# Patient Record
Sex: MALE | Race: OTHER | HISPANIC OR LATINO | URBAN - METROPOLITAN AREA
[De-identification: names, ages, dates, MRNs, and addresses within clinical notes are randomized per-mention and may not be internally consistent; named-entity substitution may affect disease eponyms.]

---

## 2017-06-20 ENCOUNTER — EMERGENCY (EMERGENCY)
Facility: HOSPITAL | Age: 32
LOS: 1 days | Discharge: LEFT BEFORE DISCHARGE | End: 2017-06-20
Admitting: EMERGENCY MEDICINE
Payer: SELF-PAY

## 2017-06-20 VITALS
DIASTOLIC BLOOD PRESSURE: 72 MMHG | OXYGEN SATURATION: 18 % | HEART RATE: 93 BPM | RESPIRATION RATE: 18 BRPM | TEMPERATURE: 98 F | WEIGHT: 149.91 LBS | SYSTOLIC BLOOD PRESSURE: 121 MMHG

## 2017-06-20 PROCEDURE — T1013: CPT

## 2017-06-20 NOTE — ED ADULT TRIAGE NOTE - CHIEF COMPLAINT QUOTE
patient states that he has had abdominal pain x1 year. he states that he has had nausea and vomitting x1 months. He states that when he gets up in the morning he feels like he has to vomit

## 2017-06-21 VITALS
TEMPERATURE: 98 F | SYSTOLIC BLOOD PRESSURE: 120 MMHG | DIASTOLIC BLOOD PRESSURE: 67 MMHG | RESPIRATION RATE: 18 BRPM | HEART RATE: 75 BPM | OXYGEN SATURATION: 98 %

## 2017-09-22 ENCOUNTER — EMERGENCY (EMERGENCY)
Facility: HOSPITAL | Age: 32
LOS: 1 days | Discharge: HOME | End: 2017-09-22
Attending: EMERGENCY MEDICINE | Admitting: EMERGENCY MEDICINE
Payer: SELF-PAY

## 2017-09-22 VITALS
RESPIRATION RATE: 18 BRPM | SYSTOLIC BLOOD PRESSURE: 147 MMHG | OXYGEN SATURATION: 99 % | HEART RATE: 87 BPM | TEMPERATURE: 98 F | WEIGHT: 154.98 LBS | DIASTOLIC BLOOD PRESSURE: 79 MMHG

## 2017-09-22 PROCEDURE — 99284 EMERGENCY DEPT VISIT MOD MDM: CPT | Mod: 25

## 2017-09-22 PROCEDURE — 99053 MED SERV 10PM-8AM 24 HR FAC: CPT

## 2017-09-23 VITALS
HEART RATE: 78 BPM | SYSTOLIC BLOOD PRESSURE: 128 MMHG | OXYGEN SATURATION: 99 % | TEMPERATURE: 98 F | DIASTOLIC BLOOD PRESSURE: 80 MMHG | RESPIRATION RATE: 20 BRPM

## 2017-09-23 LAB
ALBUMIN SERPL ELPH-MCNC: 4.7 G/DL — SIGNIFICANT CHANGE UP (ref 3.3–5.2)
ALP SERPL-CCNC: 80 U/L — SIGNIFICANT CHANGE UP (ref 40–120)
ALT FLD-CCNC: 35 U/L — SIGNIFICANT CHANGE UP
ANION GAP SERPL CALC-SCNC: 15 MMOL/L — SIGNIFICANT CHANGE UP (ref 5–17)
APPEARANCE UR: CLEAR — SIGNIFICANT CHANGE UP
AST SERPL-CCNC: 31 U/L — SIGNIFICANT CHANGE UP
BASOPHILS # BLD AUTO: 0 K/UL — SIGNIFICANT CHANGE UP (ref 0–0.2)
BASOPHILS NFR BLD AUTO: 0.3 % — SIGNIFICANT CHANGE UP (ref 0–2)
BILIRUB SERPL-MCNC: 0.3 MG/DL — LOW (ref 0.4–2)
BILIRUB UR-MCNC: NEGATIVE — SIGNIFICANT CHANGE UP
BUN SERPL-MCNC: 9 MG/DL — SIGNIFICANT CHANGE UP (ref 8–20)
CALCIUM SERPL-MCNC: 9.1 MG/DL — SIGNIFICANT CHANGE UP (ref 8.6–10.2)
CHLORIDE SERPL-SCNC: 99 MMOL/L — SIGNIFICANT CHANGE UP (ref 98–107)
CO2 SERPL-SCNC: 23 MMOL/L — SIGNIFICANT CHANGE UP (ref 22–29)
COLOR SPEC: YELLOW — SIGNIFICANT CHANGE UP
CREAT SERPL-MCNC: 0.48 MG/DL — LOW (ref 0.5–1.3)
DIFF PNL FLD: NEGATIVE — SIGNIFICANT CHANGE UP
EOSINOPHIL # BLD AUTO: 0.4 K/UL — SIGNIFICANT CHANGE UP (ref 0–0.5)
EOSINOPHIL NFR BLD AUTO: 3.9 % — SIGNIFICANT CHANGE UP (ref 0–5)
ETHANOL SERPL-MCNC: 61 MG/DL — SIGNIFICANT CHANGE UP
GLUCOSE SERPL-MCNC: 95 MG/DL — SIGNIFICANT CHANGE UP (ref 70–115)
GLUCOSE UR QL: NEGATIVE MG/DL — SIGNIFICANT CHANGE UP
HCT VFR BLD CALC: 46.9 % — SIGNIFICANT CHANGE UP (ref 42–52)
HGB BLD-MCNC: 16 G/DL — SIGNIFICANT CHANGE UP (ref 14–18)
KETONES UR-MCNC: NEGATIVE — SIGNIFICANT CHANGE UP
LEUKOCYTE ESTERASE UR-ACNC: NEGATIVE — SIGNIFICANT CHANGE UP
LIDOCAIN IGE QN: 25 U/L — SIGNIFICANT CHANGE UP (ref 22–51)
LYMPHOCYTES # BLD AUTO: 2.4 K/UL — SIGNIFICANT CHANGE UP (ref 1–4.8)
LYMPHOCYTES # BLD AUTO: 23.9 % — SIGNIFICANT CHANGE UP (ref 20–55)
MCHC RBC-ENTMCNC: 32.7 PG — HIGH (ref 27–31)
MCHC RBC-ENTMCNC: 34.1 G/DL — SIGNIFICANT CHANGE UP (ref 32–36)
MCV RBC AUTO: 95.9 FL — HIGH (ref 80–94)
MONOCYTES # BLD AUTO: 0.6 K/UL — SIGNIFICANT CHANGE UP (ref 0–0.8)
MONOCYTES NFR BLD AUTO: 6 % — SIGNIFICANT CHANGE UP (ref 3–10)
NEUTROPHILS # BLD AUTO: 6.6 K/UL — SIGNIFICANT CHANGE UP (ref 1.8–8)
NEUTROPHILS NFR BLD AUTO: 65.5 % — SIGNIFICANT CHANGE UP (ref 37–73)
NITRITE UR-MCNC: NEGATIVE — SIGNIFICANT CHANGE UP
PH UR: 7 — SIGNIFICANT CHANGE UP (ref 5–8)
PLATELET # BLD AUTO: 335 K/UL — SIGNIFICANT CHANGE UP (ref 150–400)
POTASSIUM SERPL-MCNC: 3.6 MMOL/L — SIGNIFICANT CHANGE UP (ref 3.5–5.3)
POTASSIUM SERPL-SCNC: 3.6 MMOL/L — SIGNIFICANT CHANGE UP (ref 3.5–5.3)
PROT SERPL-MCNC: 7.6 G/DL — SIGNIFICANT CHANGE UP (ref 6.6–8.7)
PROT UR-MCNC: NEGATIVE MG/DL — SIGNIFICANT CHANGE UP
RBC # BLD: 4.89 M/UL — SIGNIFICANT CHANGE UP (ref 4.6–6.2)
RBC # FLD: 13 % — SIGNIFICANT CHANGE UP (ref 11–15.6)
SODIUM SERPL-SCNC: 137 MMOL/L — SIGNIFICANT CHANGE UP (ref 135–145)
SP GR SPEC: 1 — LOW (ref 1.01–1.02)
UROBILINOGEN FLD QL: NEGATIVE MG/DL — SIGNIFICANT CHANGE UP
WBC # BLD: 10 K/UL — SIGNIFICANT CHANGE UP (ref 4.8–10.8)
WBC # FLD AUTO: 10 K/UL — SIGNIFICANT CHANGE UP (ref 4.8–10.8)

## 2017-09-23 PROCEDURE — 81003 URINALYSIS AUTO W/O SCOPE: CPT

## 2017-09-23 PROCEDURE — 83690 ASSAY OF LIPASE: CPT

## 2017-09-23 PROCEDURE — 99284 EMERGENCY DEPT VISIT MOD MDM: CPT | Mod: 25

## 2017-09-23 PROCEDURE — 96374 THER/PROPH/DIAG INJ IV PUSH: CPT

## 2017-09-23 PROCEDURE — 36415 COLL VENOUS BLD VENIPUNCTURE: CPT

## 2017-09-23 PROCEDURE — 80053 COMPREHEN METABOLIC PANEL: CPT

## 2017-09-23 PROCEDURE — 85027 COMPLETE CBC AUTOMATED: CPT

## 2017-09-23 PROCEDURE — 80307 DRUG TEST PRSMV CHEM ANLYZR: CPT

## 2017-09-23 RX ORDER — SODIUM CHLORIDE 9 MG/ML
3 INJECTION INTRAMUSCULAR; INTRAVENOUS; SUBCUTANEOUS ONCE
Qty: 0 | Refills: 0 | Status: COMPLETED | OUTPATIENT
Start: 2017-09-23 | End: 2017-09-23

## 2017-09-23 RX ORDER — FAMOTIDINE 10 MG/ML
20 INJECTION INTRAVENOUS ONCE
Qty: 0 | Refills: 0 | Status: COMPLETED | OUTPATIENT
Start: 2017-09-23 | End: 2017-09-23

## 2017-09-23 RX ORDER — SODIUM CHLORIDE 9 MG/ML
1000 INJECTION INTRAMUSCULAR; INTRAVENOUS; SUBCUTANEOUS ONCE
Qty: 0 | Refills: 0 | Status: COMPLETED | OUTPATIENT
Start: 2017-09-23 | End: 2017-09-23

## 2017-09-23 RX ADMIN — SODIUM CHLORIDE 3 MILLILITER(S): 9 INJECTION INTRAMUSCULAR; INTRAVENOUS; SUBCUTANEOUS at 02:46

## 2017-09-23 RX ADMIN — SODIUM CHLORIDE 1000 MILLILITER(S): 9 INJECTION INTRAMUSCULAR; INTRAVENOUS; SUBCUTANEOUS at 02:46

## 2017-09-23 RX ADMIN — Medication 30 MILLILITER(S): at 02:45

## 2017-09-23 RX ADMIN — FAMOTIDINE 20 MILLIGRAM(S): 10 INJECTION INTRAVENOUS at 02:45

## 2017-09-23 NOTE — ED PROVIDER NOTE - OBJECTIVE STATEMENT
31 yo male smoker, with a h/o alcohol use (drank today) c/o abdominal pain x 1 year.  worse when supine and when waking up in AM  + nausea  never had a work up

## 2017-09-23 NOTE — ED ADULT NURSE NOTE - OBJECTIVE STATEMENT
Pt received sitting on stretcher in NAD. Pt AOx3 C/o Epi gastric pain x2weeks.  Neuro WNL. PERRLA. Lungs CTA, RR even unlabored. Ab soft non distended. nontender, + bowel sounds x 4quads. Denies Nausea, Vomiting, Diarrhea. Skin warm, dry, color appropriate for age and race.

## 2017-09-23 NOTE — ED PROVIDER NOTE - CONSTITUTIONAL, MLM
normal... Well appearing, well nourished, awake, alert, oriented to person, place, time/situation and in no apparent distress. + AOB

## 2017-09-23 NOTE — ED PROVIDER NOTE - MEDICAL DECISION MAKING DETAILS
abd pain x 1 yr  - h&p suggests gerd/gastritis  - check labs and re-evaluate after gi meds  - no indication for imaging at this time

## 2020-03-06 NOTE — ED PROVIDER NOTE - PROGRESS NOTE DETAILS
Date of last refill of this med was 2/27, # of pills given 270 and # of refills given 1. Their next appointment is 3/30, the last date patient was seen was 9/30. Does patient have medication agreement on file? no  Has drug screen been done in last 12 months if needed?  no endorsed to evening team .   follow up CMP  if nml d/c with instructions on GERD recd sign out  cmp checked, discussed with patient in Uzbek follow up addressed

## 2021-03-09 ENCOUNTER — EMERGENCY (EMERGENCY)
Facility: HOSPITAL | Age: 36
LOS: 1 days | Discharge: HOME | End: 2021-03-09
Attending: EMERGENCY MEDICINE
Payer: MEDICAID

## 2021-03-09 VITALS
DIASTOLIC BLOOD PRESSURE: 79 MMHG | TEMPERATURE: 98 F | HEIGHT: 67 IN | OXYGEN SATURATION: 98 % | SYSTOLIC BLOOD PRESSURE: 127 MMHG | RESPIRATION RATE: 16 BRPM | HEART RATE: 91 BPM

## 2021-03-09 VITALS
TEMPERATURE: 99 F | HEART RATE: 77 BPM | SYSTOLIC BLOOD PRESSURE: 120 MMHG | RESPIRATION RATE: 18 BRPM | OXYGEN SATURATION: 98 % | DIASTOLIC BLOOD PRESSURE: 78 MMHG

## 2021-03-09 LAB
ALBUMIN SERPL ELPH-MCNC: 4.8 G/DL — SIGNIFICANT CHANGE UP (ref 3.3–5.2)
ALP SERPL-CCNC: 87 U/L — SIGNIFICANT CHANGE UP (ref 40–120)
ALT FLD-CCNC: 47 U/L — HIGH
ANION GAP SERPL CALC-SCNC: 13 MMOL/L — SIGNIFICANT CHANGE UP (ref 5–17)
APPEARANCE UR: CLEAR — SIGNIFICANT CHANGE UP
AST SERPL-CCNC: 31 U/L — SIGNIFICANT CHANGE UP
BILIRUB SERPL-MCNC: 0.2 MG/DL — LOW (ref 0.4–2)
BILIRUB UR-MCNC: NEGATIVE — SIGNIFICANT CHANGE UP
BUN SERPL-MCNC: 14 MG/DL — SIGNIFICANT CHANGE UP (ref 8–20)
CALCIUM SERPL-MCNC: 8.9 MG/DL — SIGNIFICANT CHANGE UP (ref 8.6–10.2)
CHLORIDE SERPL-SCNC: 103 MMOL/L — SIGNIFICANT CHANGE UP (ref 98–107)
CO2 SERPL-SCNC: 24 MMOL/L — SIGNIFICANT CHANGE UP (ref 22–29)
COLOR SPEC: YELLOW — SIGNIFICANT CHANGE UP
CREAT SERPL-MCNC: 0.87 MG/DL — SIGNIFICANT CHANGE UP (ref 0.5–1.3)
DIFF PNL FLD: NEGATIVE — SIGNIFICANT CHANGE UP
GLUCOSE SERPL-MCNC: 96 MG/DL — SIGNIFICANT CHANGE UP (ref 70–99)
GLUCOSE UR QL: NEGATIVE MG/DL — SIGNIFICANT CHANGE UP
HCT VFR BLD CALC: 44.7 % — SIGNIFICANT CHANGE UP (ref 39–50)
HGB BLD-MCNC: 14.7 G/DL — SIGNIFICANT CHANGE UP (ref 13–17)
KETONES UR-MCNC: NEGATIVE — SIGNIFICANT CHANGE UP
LEUKOCYTE ESTERASE UR-ACNC: NEGATIVE — SIGNIFICANT CHANGE UP
MCHC RBC-ENTMCNC: 31.3 PG — SIGNIFICANT CHANGE UP (ref 27–34)
MCHC RBC-ENTMCNC: 32.9 GM/DL — SIGNIFICANT CHANGE UP (ref 32–36)
MCV RBC AUTO: 95.3 FL — SIGNIFICANT CHANGE UP (ref 80–100)
NITRITE UR-MCNC: NEGATIVE — SIGNIFICANT CHANGE UP
PH UR: 7 — SIGNIFICANT CHANGE UP (ref 5–8)
PLATELET # BLD AUTO: 303 K/UL — SIGNIFICANT CHANGE UP (ref 150–400)
POTASSIUM SERPL-MCNC: 3.7 MMOL/L — SIGNIFICANT CHANGE UP (ref 3.5–5.3)
POTASSIUM SERPL-SCNC: 3.7 MMOL/L — SIGNIFICANT CHANGE UP (ref 3.5–5.3)
PROT SERPL-MCNC: 7.1 G/DL — SIGNIFICANT CHANGE UP (ref 6.6–8.7)
PROT UR-MCNC: NEGATIVE MG/DL — SIGNIFICANT CHANGE UP
RBC # BLD: 4.69 M/UL — SIGNIFICANT CHANGE UP (ref 4.2–5.8)
RBC # FLD: 11.9 % — SIGNIFICANT CHANGE UP (ref 10.3–14.5)
SODIUM SERPL-SCNC: 140 MMOL/L — SIGNIFICANT CHANGE UP (ref 135–145)
SP GR SPEC: 1.01 — SIGNIFICANT CHANGE UP (ref 1.01–1.02)
UROBILINOGEN FLD QL: NEGATIVE MG/DL — SIGNIFICANT CHANGE UP
WBC # BLD: 9.72 K/UL — SIGNIFICANT CHANGE UP (ref 3.8–10.5)
WBC # FLD AUTO: 9.72 K/UL — SIGNIFICANT CHANGE UP (ref 3.8–10.5)

## 2021-03-09 PROCEDURE — 74176 CT ABD & PELVIS W/O CONTRAST: CPT | Mod: 26,MA

## 2021-03-09 PROCEDURE — 99285 EMERGENCY DEPT VISIT HI MDM: CPT

## 2021-03-09 PROCEDURE — 81003 URINALYSIS AUTO W/O SCOPE: CPT

## 2021-03-09 PROCEDURE — 99284 EMERGENCY DEPT VISIT MOD MDM: CPT | Mod: 25

## 2021-03-09 PROCEDURE — 74176 CT ABD & PELVIS W/O CONTRAST: CPT

## 2021-03-09 PROCEDURE — 36415 COLL VENOUS BLD VENIPUNCTURE: CPT

## 2021-03-09 PROCEDURE — 96374 THER/PROPH/DIAG INJ IV PUSH: CPT

## 2021-03-09 PROCEDURE — 80053 COMPREHEN METABOLIC PANEL: CPT

## 2021-03-09 PROCEDURE — 85027 COMPLETE CBC AUTOMATED: CPT

## 2021-03-09 RX ORDER — SODIUM CHLORIDE 9 MG/ML
1000 INJECTION INTRAMUSCULAR; INTRAVENOUS; SUBCUTANEOUS ONCE
Refills: 0 | Status: COMPLETED | OUTPATIENT
Start: 2021-03-09 | End: 2021-03-09

## 2021-03-09 RX ORDER — KETOROLAC TROMETHAMINE 30 MG/ML
30 SYRINGE (ML) INJECTION ONCE
Refills: 0 | Status: DISCONTINUED | OUTPATIENT
Start: 2021-03-09 | End: 2021-03-09

## 2021-03-09 RX ADMIN — Medication 30 MILLIGRAM(S): at 14:04

## 2021-03-09 RX ADMIN — SODIUM CHLORIDE 1000 MILLILITER(S): 9 INJECTION INTRAMUSCULAR; INTRAVENOUS; SUBCUTANEOUS at 14:04

## 2021-03-09 NOTE — ED STATDOCS - OBJECTIVE STATEMENT
36 y/o male with no PMHx presents to ED c/o urinary symptoms. Patient reports suprapubic and back pain for the past week, gradually worsening, also reporting painful urination and right testicle pain.     PMD: Dr. Coleman

## 2021-03-09 NOTE — ED STATDOCS - CLINICAL SUMMARY MEDICAL DECISION MAKING FREE TEXT BOX
abdominal pain for 1 week with urinary symptoms. Evaluate for UTI vs renal stone. Check labs, UA, CT renal

## 2021-03-09 NOTE — ED ADULT NURSE NOTE - OBJECTIVE STATEMENT
Patient c/o flank pain and urinary symptoms. Patient denies discharge, denies fever. alert and oriented x4, skin is warm and dry, respirations are unlabored.

## 2021-03-09 NOTE — ED ADULT NURSE NOTE - NSIMPLEMENTINTERV_GEN_ALL_ED
Implemented All Universal Safety Interventions:  [place] to call system. Call bell, personal items and telephone within reach. Instruct patient to call for assistance. Room bathroom lighting operational. Non-slip footwear when patient is off stretcher. Physically safe environment: no spills, clutter or unnecessary equipment. Stretcher in lowest position, wheels locked, appropriate side rails in place.

## 2021-03-09 NOTE — ED STATDOCS - NS_ ATTENDINGSCRIBEDETAILS _ED_A_ED_FT
I, Rafa Brown, performed the initial face to face bedside interview with this patient regarding history of present illness, review of symptoms and relevant past medical, social and family history.  I completed an independent physical examination.  I was the initial provider who evaluated this patient. I have signed out the follow up of any pending tests (i.e. labs, radiological studies) to the ACP.  I have communicated the patient’s plan of care and disposition with the ACP.  The history, relevant review of systems, past medical and surgical history, medical decision making, and physical examination was documented by the scribe in my presence and I attest to the accuracy of the documentation.

## 2021-03-09 NOTE — ED STATDOCS - CARE PROVIDER_API CALL
Db Mcknight (MD)  Gastroenterology; Internal Medicine  [address], [address]  [place] [address]  Phone: [phone]  Fax: [phone]  Follow Up Time: 7-10 Days

## 2021-03-09 NOTE — ED STATDOCS - PATIENT PORTAL LINK FT
You can access the FollowMyHealth Patient Portal offered by [facility] by registering at the following website: [email or web address]. By joining [employer]’s FollowMyHealth portal, you will also be able to view your health information using other applications (apps) compatible with our system.

## 2021-03-09 NOTE — ED STATDOCS - CPE ED MUSC NORM
Christine Morgan   MRN: [id number]    Department:  [facility] Emergency Department   Date of Visit:  5/23/2019           Disclosure     Insurance plans vary and the physician(s) referred by the ER may not be covered by your plan.  Please contact CARE PHYSICIAN AT ONCE OR RETURN IMMEDIATELY TO THE EMERGENCY DEPARTMENT. If you have been prescribed any medication(s), please fill your prescription right away and begin taking the medication(s) as directed.   If you believe that any of the medications normal...

## 2021-03-09 NOTE — ED STATDOCS - PROGRESS NOTE DETAILS
ALTAF OSBORN: PT evaluated by intake physician. HPI/PE/ROS as noted above. Will follow up plan per intake physician   discussesd with patient results of blood work and CT. advised on fu with pmd and given gi referral   given strict return precautions verbalizes understanding

## 2021-03-09 NOTE — ED ADULT TRIAGE NOTE - CHIEF COMPLAINT QUOTE
Pt with 1 weeks of suprapubic pain and R flank pain with urinary difficulties/pain. Started on Cipro and not helping.

## 2021-06-29 ENCOUNTER — EMERGENCY (EMERGENCY)
Facility: HOSPITAL | Age: 36
LOS: 1 days | Discharge: HOME | End: 2021-06-29
Attending: EMERGENCY MEDICINE
Payer: MEDICAID

## 2021-06-29 VITALS
SYSTOLIC BLOOD PRESSURE: 143 MMHG | OXYGEN SATURATION: 97 % | HEIGHT: 67 IN | WEIGHT: 169.98 LBS | DIASTOLIC BLOOD PRESSURE: 79 MMHG | HEART RATE: 99 BPM | TEMPERATURE: 98 F | RESPIRATION RATE: 16 BRPM

## 2021-06-29 LAB
ALBUMIN SERPL ELPH-MCNC: 4.2 G/DL — SIGNIFICANT CHANGE UP (ref 3.3–5.2)
ALP SERPL-CCNC: 91 U/L — SIGNIFICANT CHANGE UP (ref 40–120)
ALT FLD-CCNC: 103 U/L — HIGH
ANION GAP SERPL CALC-SCNC: 15 MMOL/L — SIGNIFICANT CHANGE UP (ref 5–17)
AST SERPL-CCNC: 47 U/L — HIGH
BASOPHILS # BLD AUTO: 0.05 K/UL — SIGNIFICANT CHANGE UP (ref 0–0.2)
BASOPHILS NFR BLD AUTO: 0.7 % — SIGNIFICANT CHANGE UP (ref 0–2)
BILIRUB SERPL-MCNC: <0.2 MG/DL — LOW (ref 0.4–2)
BUN SERPL-MCNC: 11 MG/DL — SIGNIFICANT CHANGE UP (ref 8–20)
CALCIUM SERPL-MCNC: 8.6 MG/DL — SIGNIFICANT CHANGE UP (ref 8.6–10.2)
CHLORIDE SERPL-SCNC: 104 MMOL/L — SIGNIFICANT CHANGE UP (ref 98–107)
CO2 SERPL-SCNC: 21 MMOL/L — LOW (ref 22–29)
CREAT SERPL-MCNC: 0.53 MG/DL — SIGNIFICANT CHANGE UP (ref 0.5–1.3)
EOSINOPHIL # BLD AUTO: 0.31 K/UL — SIGNIFICANT CHANGE UP (ref 0–0.5)
EOSINOPHIL NFR BLD AUTO: 4.6 % — SIGNIFICANT CHANGE UP (ref 0–6)
GLUCOSE SERPL-MCNC: 111 MG/DL — HIGH (ref 70–99)
HCT VFR BLD CALC: 43.7 % — SIGNIFICANT CHANGE UP (ref 39–50)
HGB BLD-MCNC: 14.3 G/DL — SIGNIFICANT CHANGE UP (ref 13–17)
IMM GRANULOCYTES NFR BLD AUTO: 1.5 % — SIGNIFICANT CHANGE UP (ref 0–1.5)
LIDOCAIN IGE QN: 33 U/L — SIGNIFICANT CHANGE UP (ref 22–51)
LYMPHOCYTES # BLD AUTO: 2.45 K/UL — SIGNIFICANT CHANGE UP (ref 1–3.3)
LYMPHOCYTES # BLD AUTO: 36.7 % — SIGNIFICANT CHANGE UP (ref 13–44)
MCHC RBC-ENTMCNC: 31.1 PG — SIGNIFICANT CHANGE UP (ref 27–34)
MCHC RBC-ENTMCNC: 32.7 GM/DL — SIGNIFICANT CHANGE UP (ref 32–36)
MCV RBC AUTO: 95 FL — SIGNIFICANT CHANGE UP (ref 80–100)
MONOCYTES # BLD AUTO: 0.57 K/UL — SIGNIFICANT CHANGE UP (ref 0–0.9)
MONOCYTES NFR BLD AUTO: 8.5 % — SIGNIFICANT CHANGE UP (ref 2–14)
NEUTROPHILS # BLD AUTO: 3.2 K/UL — SIGNIFICANT CHANGE UP (ref 1.8–7.4)
NEUTROPHILS NFR BLD AUTO: 48 % — SIGNIFICANT CHANGE UP (ref 43–77)
PLATELET # BLD AUTO: 290 K/UL — SIGNIFICANT CHANGE UP (ref 150–400)
POTASSIUM SERPL-MCNC: 3.3 MMOL/L — LOW (ref 3.5–5.3)
POTASSIUM SERPL-SCNC: 3.3 MMOL/L — LOW (ref 3.5–5.3)
PROT SERPL-MCNC: 6.6 G/DL — SIGNIFICANT CHANGE UP (ref 6.6–8.7)
RBC # BLD: 4.6 M/UL — SIGNIFICANT CHANGE UP (ref 4.2–5.8)
RBC # FLD: 12 % — SIGNIFICANT CHANGE UP (ref 10.3–14.5)
SODIUM SERPL-SCNC: 140 MMOL/L — SIGNIFICANT CHANGE UP (ref 135–145)
WBC # BLD: 6.68 K/UL — SIGNIFICANT CHANGE UP (ref 3.8–10.5)
WBC # FLD AUTO: 6.68 K/UL — SIGNIFICANT CHANGE UP (ref 3.8–10.5)

## 2021-06-29 PROCEDURE — 83690 ASSAY OF LIPASE: CPT

## 2021-06-29 PROCEDURE — 96375 TX/PRO/DX INJ NEW DRUG ADDON: CPT

## 2021-06-29 PROCEDURE — 99284 EMERGENCY DEPT VISIT MOD MDM: CPT | Mod: 25

## 2021-06-29 PROCEDURE — 36415 COLL VENOUS BLD VENIPUNCTURE: CPT

## 2021-06-29 PROCEDURE — 80053 COMPREHEN METABOLIC PANEL: CPT

## 2021-06-29 PROCEDURE — 99285 EMERGENCY DEPT VISIT HI MDM: CPT

## 2021-06-29 PROCEDURE — 96374 THER/PROPH/DIAG INJ IV PUSH: CPT

## 2021-06-29 PROCEDURE — 85025 COMPLETE CBC W/AUTO DIFF WBC: CPT

## 2021-06-29 RX ORDER — ONDANSETRON 8 MG/1
1 TABLET, FILM COATED ORAL
Qty: 16 | Refills: 0
Start: 2021-06-29 | End: 2021-07-02

## 2021-06-29 RX ORDER — OMEPRAZOLE 10 MG/1
1 CAPSULE, DELAYED RELEASE ORAL
Qty: 21 | Refills: 0
Start: 2021-06-29 | End: 2021-07-19

## 2021-06-29 RX ORDER — FAMOTIDINE 10 MG/ML
20 INJECTION INTRAVENOUS ONCE
Refills: 0 | Status: COMPLETED | OUTPATIENT
Start: 2021-06-29 | End: 2021-06-29

## 2021-06-29 RX ORDER — ACETAMINOPHEN 500 MG
650 TABLET ORAL ONCE
Refills: 0 | Status: COMPLETED | OUTPATIENT
Start: 2021-06-29 | End: 2021-06-29

## 2021-06-29 RX ORDER — ONDANSETRON 8 MG/1
4 TABLET, FILM COATED ORAL ONCE
Refills: 0 | Status: COMPLETED | OUTPATIENT
Start: 2021-06-29 | End: 2021-06-29

## 2021-06-29 RX ADMIN — Medication 650 MILLIGRAM(S): at 02:23

## 2021-06-29 RX ADMIN — FAMOTIDINE 20 MILLIGRAM(S): 10 INJECTION INTRAVENOUS at 02:23

## 2021-06-29 RX ADMIN — ONDANSETRON 4 MILLIGRAM(S): 8 TABLET, FILM COATED ORAL at 02:23

## 2021-06-29 RX ADMIN — Medication 30 MILLILITER(S): at 02:22

## 2021-06-29 NOTE — ED PROVIDER NOTE - CARE PLAN
Principal Discharge DX:	Abdominal pain   Principal Discharge DX:	Acute gastritis without hemorrhage, unspecified gastritis type  Secondary Diagnosis:	Transaminitis  Secondary Diagnosis:	Hypokalemia

## 2021-06-29 NOTE — ED PROVIDER NOTE - OBJECTIVE STATEMENT
PT with no SPMHx presents to the ED with complaint of acute on chronic abd pain. Pt states that he has been having abd pian for 2 months and was seen for this but medication was not helpful. Pt states that he gets epigastric burning when he eats and specifically when he drinks alcohol. Pt states that he has interment moderate burning pain that radiates up her abd, is self limiting, not improved by anything. Pt dines fever, chills, weakness, numbness, tingling, HA, dizziness, CP, cough, back pain.

## 2021-06-29 NOTE — ED PROVIDER NOTE - NSFOLLOWUPINSTRUCTIONS_ED_ALL_ED_FT
Educación para el paciente: Dolor abdominal intenso (Conceptos Básicos)  View in English  Redactado por los médicos y editores de [employer]  ¿Existen distintos tipos de dolor abdominal?  Sí. "Dolor abdominal" significa dolor en el abdomen (o área del estómago), la parte del cuerpo que está entre el pecho y el área genital. Danielle dolor puede tener distintas causas. Puede ser "crónico", es decir que se desarrolla con el tiempo, o "jaci", lo que significa que comienza de repente. Puede ser leve o grave. El dolor se puede sentir en todo el abdomen, o solo en elena parte.    Cada persona puede sentir un dolor abdominal diferente. Puede ser penetrante o john cólicos, o puede ser sordo y jane. Algunas personas se sienten mejor si doblan deng cuerpo lo más posible, mientras que otras necesitan acostarse y quedarse totalmente quietas. Con frecuencia, las personas sienten náuseas y tienen arcadas o vomitan.    Los médicos usan el término “abdomen jaci” para describir un episodio de dolor abdominal intenso que comienza repentinamente y dura unas horas o más. Puede causar un dolor tan intenso, que la persona tiene dificultades para moverse o respirar, y siente la necesidad de ir al hospital o consultar a deng médico o enfermero de inmediato. Un verdadero abdomen jaci es elena emergencia médica.    ¿Cuál es la causa del dolor abdominal?  El dolor abdominal puede tener muchas causas diferentes. Cuando el dolor es menos intenso, puede deberse a algo john un virus o elena inflamación del estómago (llamada “gastritis”).    El dolor jaci más intenso puede deberse a problemas en pj o más de los órganos que están en el abdomen, los cuales pueden formar parte del sistema digestivo, el sistema urinario o el sistema reproductivo (figura 1 y figura 2 y figura 3).    Los padecimientos que afectan a los órganos que se encuentran en el pecho o en la calvin genital también pueden causar dolor. Aunque esos órganos no estén en el abdomen, igual podrían causar dolor abdominal.    Algunas de las causas comunes del dolor abdominal jaci en adultos son:    ?Apendicitis – Apendicitis es el término que se usa cuando el apéndice (elena bolsa larga y delgada que cuelga de elena parte del intestino grueso) se infecta y se inflama.    ?Diverticulitis – La diverticulitis es elena infección que se desarrolla en unos sacos pequeños que pueden formarse en el intestino. [place] es común en las personas mayores.    ?Cálculos biliares – Los cálculos biliares son piedras pequeñas que se shannon dentro de un órgano llamado vesícula biliar, que almacena bilis, un líquido que ayuda al cuerpo a digerir la grasa.    ?Absceso – Un absceso es elena acumulación de pus. Puede formarse en el abdomen, normalmente cerca del intestino.    ?Cálculos renales – Se pueden formar cálculos renales cuando las sales y los minerales que se encuentran normalmente en la orina se acumulan y se endurecen. Pueden provocar dolor al pasar a través de los uréteres, que son los tubos que transportan la orina desde el riñón hasta la vejiga.    ?Perforación de los intestinos – Es un agujero en la pared intestinal.    ?Úlcera perforada – Es un agujero en la pared del estómago o del intestino.    ?Pancreatitis – Es el término que se usa cuando el páncreas se inflama.    ?Ruptura de un quiste de ovario – Los quistes de ovario son sacos llenos de líquido que se pueden formar en algunas mujeres. A veces se rompen, lo que significa que pueden abrirse y derramar el líquido.    ?Embarazo ectópico – Un embarazo ectópico es un embarazo que se desarrolla fuera del útero.    ¿Nanci consultar a un médico o enfermero?  Sí. Si tiene un dolor abdominal repentino o intenso, llame deng médico o enfermero, o vaya al [facility]. Según cuál sea la causa del dolor, es posible que necesite tratamiento.    ¿Es necesario que me realice pruebas?  Es probable que sí. El médico o enfermero le preguntará sobre ousmane síntomas, en dónde y cómo se siente el dolor. La ubicación del dolor puede ser un dato importante para determinar la causa.    Además, el médico le hará preguntas sobre ousmane padecimientos médicos actuales y pasados, y lo examinará. Es posible que después de un tiempo el médico repita los exámenes para hacer un seguimiento de ousmane síntomas.    El médico también decidirá qué pruebas debe hacerse según ousmane síntomas y situación personal. Las pruebas podrían incluir:    ?Pruebas de linnette    ?Pruebas de orina    ?Radiografías    ?Un ultrasonido, elena tomografía u otro estudio de imagen – Los estudios de imagen crean imágenes del interior del cuerpo.    ¿Cómo se trata el dolor abdominal?  El tratamiento depende de la causa del dolor, y puede incluir elena o más de las siguientes opciones:    ?Líquidos que se administran a través de elena vía intravenosa    ?Medicinas para el dolor    ?Antibióticos para tratar elena infección    ?Otras medicinas para tratar otros padecimientos médicos    ?Cirugía

## 2021-06-29 NOTE — ED ADULT TRIAGE NOTE - CHIEF COMPLAINT QUOTE
Patient ambulated into ED with steady gait, BIBA c/o abd pain, pt has history of stomach ulcers, has had pain for 2 months has seen a doctor in the past medication that was given did not help. Pt admits to having a few beers earlier today at 5pm, vomited x1 yesterday bile denies any blood.

## 2021-06-29 NOTE — ED PROVIDER NOTE - ATTENDING CONTRIBUTION TO CARE
34 yo M p/w epigastric pain x 2 mo, worse with food and especially etoh. burning sensation  vss  pe unremarkable  abd soft ntnd    labs remarkable for transaminitis ALT >AST and mild hypoK 3.3  improved with gi cocktail  likely gastritis  dc with pmd and gi f/u  Based on the H&P, I do not suspect any life- / limb- threatening pathology that requires further intervention at this time.

## 2021-06-29 NOTE — ED PROVIDER NOTE - PATIENT PORTAL LINK FT
You can access the FollowMyHealth Patient Portal offered by [facility] by registering at the following website: [email or web address]. By joining [employer]’s FollowMyHealth portal, you will also be able to view your health information using other applications (apps) compatible with our system.

## 2021-06-29 NOTE — ED PROVIDER NOTE - ADDITIONAL NOTES AND INSTRUCTIONS:
PT was evaluated At [facility] ED and was found to have a condition that warranted time of to rest and heal from WORK/SCHOOL.   Pipo Galvez PA-C

## 2021-06-29 NOTE — ED PROVIDER NOTE - CARE PROVIDER_API CALL
Emiliano Estrada)  Gastroenterology; Internal Medicine  [address], [address]  [place] [address]  Phone: [phone]  Fax: [phone]  Follow Up Time:

## 2021-06-29 NOTE — ED PROVIDER NOTE - NS ED ROS FT
ROS: CONTUSIONAL: Denies fever, chills, fatigue, wt loss. HEAD: Denies trauma, HA, Dizziness. EYE: Denies Acute visual changes, diplopia. ENMT: Denies change in hearing, tinnitus, epistaxis, difficulty swallowing, sore throat. CARDIO: Denies CP, palpitations, edema. RESP: Denies Cough, SOB , Diff breathing, hemoptysis. GI: + ABD pain, Denies N/V, change in bowel movement. URINARY: Denies difficulty urinating, pelvic pain. MS:  Denies joint pain, back pain, weakness, decreased ROM, swelling. NEURO: Denies change in gait, seizures, loss of sensation, dizziness, confusion LOC.  PSY: NO SI/HI. SKIN: Denies Rash, bruising.

## 2021-06-29 NOTE — ED ADULT NURSE REASSESSMENT NOTE - NS ED NURSE REASSESS COMMENT FT1
pt triaged, treated and dispo by provider, pt verbalized understanding of discharge instructions,, refer to provider notes.

## 2021-06-29 NOTE — ED PROVIDER NOTE - CLINICAL SUMMARY MEDICAL DECISION MAKING FREE TEXT BOX
PT with stable VS, no acute distress, non toxic appearing, tolerating PO in the ED, Pt with no acute findings on labs, Pt with possible alcohol induced gastritis, educated about follow up, pain meds to the pharmacy, educated about when to return to the ED if needed. PT verbalizes that he understands all instructions and results. Pt informed that ED is open and available 24/7 365 days a yr, encouraged to return to the ED if they have any change in condition, or feel the need for revaluation.    utilized to obtain History, ROS, Physical Exam, explanations of results and plan of care, as well as follow up instructions.

## 2022-08-28 ENCOUNTER — EMERGENCY (EMERGENCY)
Facility: HOSPITAL | Age: 37
LOS: 1 days | Discharge: HOME | End: 2022-08-28
Attending: STUDENT IN AN ORGANIZED HEALTH CARE EDUCATION/TRAINING PROGRAM
Payer: COMMERCIAL

## 2022-08-28 VITALS
OXYGEN SATURATION: 98 % | HEIGHT: 67 IN | SYSTOLIC BLOOD PRESSURE: 144 MMHG | TEMPERATURE: 99 F | RESPIRATION RATE: 20 BRPM | HEART RATE: 86 BPM | WEIGHT: 199.96 LBS | DIASTOLIC BLOOD PRESSURE: 79 MMHG

## 2022-08-28 PROCEDURE — 99053 MED SERV 10PM-8AM 24 HR FAC: CPT

## 2022-08-28 PROCEDURE — 99284 EMERGENCY DEPT VISIT MOD MDM: CPT
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
## 2022-08-29 PROCEDURE — 73080 X-RAY EXAM OF ELBOW: CPT | Mod: 26,LT

## 2022-08-29 PROCEDURE — 73090 X-RAY EXAM OF FOREARM: CPT

## 2022-08-29 PROCEDURE — 99284 EMERGENCY DEPT VISIT MOD MDM: CPT

## 2022-08-29 PROCEDURE — 73080 X-RAY EXAM OF ELBOW: CPT

## 2022-08-29 PROCEDURE — 73090 X-RAY EXAM OF FOREARM: CPT | Mod: 26,LT

## 2022-08-29 RX ORDER — METHOCARBAMOL 500 MG/1
1 TABLET, FILM COATED ORAL
Qty: 15 | Refills: 0
Start: 2022-08-29 | End: 2022-09-02

## 2022-08-29 RX ORDER — IBUPROFEN 200 MG
1 TABLET ORAL
Qty: 20 | Refills: 0
Start: 2022-08-29 | End: 2022-09-02

## 2022-08-29 NOTE — ED PROVIDER NOTE - CPE EDP MUSC NORM
HPI     Diabetic Eye Exam     Comments: Yearly              Comments     NP to DNL  Last seen by [facility] on 10/16/19 for yearly DM eye exam  Diabetic eye exam  Diagnosed with diabetes 10+ years  Recent vision fluctuations No  Lab Results       Component                Value               Date                       HGBA1C                   6.7 (H)             08/13/2020            HPI    Any vision changes since last exam: No   Eye pain: No  Other ocular symptoms: No    Do you wear currently wear glasses or contacts? Glasses    Interested in contacts today? No    Do you plan on getting new glasses today? If needed                Last edited by Yvette Gonzales, PCT on 10/20/2020 11:01 AM.   (History)            Assessment /Plan     For exam results, see Encounter Report.    Type 2 diabetes mellitus without retinopathy  No diabetic retinopathy in either eye  Continue close care with PCP   Monitor 12 months    Nuclear sclerosis, bilateral  Cortical age-related cataract of left eye  Diabetic cataract of both eyes  Surgery is not indicated at this time.   Monitor 12 months.    Hyperopia with presbyopia, bilateral  Spec Rx is optional, ok to continue with OTC readers      RTC 1 yr for dilated eye exam or PRN if any problems.   Discussed above and answered questions.                    - - -
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
## 2022-08-29 NOTE — ED PROVIDER NOTE - NS ED ATTENDING STATEMENT MOD
This was a shared visit with the DERIC. I reviewed and verified the documentation and independently performed the documented:
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
## 2022-08-29 NOTE — ED PROVIDER NOTE - PATIENT PORTAL LINK FT
You can access the FollowMyHealth Patient Portal offered by [facility] by registering at the following website: [email or web address]. By joining [employer]’s FollowMyHealth portal, you will also be able to view your health information using other applications (apps) compatible with our system.

## 2022-08-29 NOTE — ED PROVIDER NOTE - CONSTITUTIONAL, MLM
Please fax results for Reza Mcclelland (: [birth year]) to Dr Guevara and call his nurse to make sure they are addressed.     Faxed as requested & the office was alerted.   normal... Well appearing, awake, alert, oriented to person, place, time/situation and in no apparent distress.

## 2022-08-29 NOTE — ED PROVIDER NOTE - NSFOLLOWUPINSTRUCTIONS_ED_ALL_ED_FT
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
## 2022-11-29 NOTE — ED ADULT NURSE NOTE - NS_NURSE_DISC_TEACHING_YN_ED_ALL_ED
Outpatient Progress Note      CHIEF COMPLAINT:    Chief Complaint   Patient presents with   • Follow-up     HTN   • Back Pain   • Leg Pain   • Hand Pain       HISTORY OF PRESENT ILLNESS:    The patient is a 52 year old female who presents with complaints as below:    HTN Stable  VIt D Def uncontrolled  Prediabetes uncontrolled    Neuropathic pain uncontrolled Lyrica helping some wants us to continue the Rx.    Trigger finger both hands.    Past Medical History:  Past Medical History:   Diagnosis Date   • Anemia, unspecified 04/02/2012   • Anxiety    • Benign essential HTN 12/23/2011   • Cervical disc disease 05/03/2016   • Cervical spinal stenosis 05/03/2016   • Depression    • Essential hypertension 12/23/2011   • Family history of breast cancer in first degree relative 07/23/2014   • History of cervical cancer 10/14/2015   • Meralgia paresthetica 11/27/2007   • Vitamin D deficiency 11/26/2013   • Wears glasses        Past Surgical History:  Past Surgical History:   Procedure Laterality Date   • Colonoscopy diagnostic  08/17/2016    Dr Lopez   • Colonoscopy diagnostic  06/20/2022    Dr. Renteria   • Conization cervix,knife/laser  01/01/1985   • Hysterectomy  01/01/2012    supracervical, ovaries intact   • Laparoscopy, cholecystectomy  01/01/1997    Cholecystectomy, laparoscopic   • Removal gallbladder         Current Medications:  Current Outpatient Medications   Medication Sig Dispense Refill   • gabapentin (NEURONTIN) 800 MG tablet Take 1 tablet by mouth in the morning and 1 tablet at noon and 1 tablet in the evening. 90 tablet 1   • diclofenac (VOLTAREN) 50 MG EC tablet Take 1 tablet by mouth 2 times daily as needed (pain). 60 tablet 0   • cyclobenzaprine (FLEXERIL) 5 MG tablet Take 1 tablet by mouth 3 times daily as needed (pain). 30 tablet 0   • HYDROcodone-acetaminophen (NORCO) 7.5-325 MG per tablet Take 1 tablet by mouth every 6 hours as needed.     • losartan-hydrochlorothiazide (HYZAAR) 50-12.5 MG per  tablet TAKE 1 TABLET BY MOUTH DAILY 90 tablet 0   • polyethylene glycol (MIRALAX) 17 GM/SCOOP powder Take 17 g by mouth daily. 255 g 0   • Biotin 1 MG Cap Take 1 mg by mouth daily.     • diazePAM (VALIUM) 5 MG tablet Take 1 tab 30 minutes prior to MRI.  Take additional tablet immediately before MRI, if needed 2 tablet 0   • Multiple Vitamin (MULTI-VITAMIN DAILY PO)      • rizatriptan (MAXALT) 10 MG tablet Take 1 tablet by mouth at onset of migraine. May repeat after 2 hours if needed. 10 tablet 0   • DISPENSE Compounded: Diclofenac 3%, lidocaine 5%. Apply 1-2 gm, thin film topically to painful areas of feet TID. 120 g 5   • LORazepam (ATIVAN) 1 MG tablet Take 1 tablet by mouth every 6 hours as needed for Anxiety. 3 tablet 0   • aspirin (ASPIR-81) 81 MG EC tablet Take 1 tablet by mouth daily. 0 30 tablet 5     No current facility-administered medications for this visit.       Allergies:  ALLERGIES:   Allergen Reactions   • Amlodipine SWELLING   • Reglan Palpitations     Anxiety, tremors, tachycardia       SOCIAL HISTORY:  Social History     Tobacco Use   • Smoking status: Never Smoker   • Smokeless tobacco: Never Used   Vaping Use   • Vaping Use: never used   Substance Use Topics   • Alcohol use: Not Currently     Alcohol/week: 0.0 - 1.0 standard drinks   • Drug use: No         Drug Use:    No                FAMILY HISTORY:  Family History   Problem Relation Age of Onset   • Heart Mother    • Cancer Mother         Breast   • Hypertension Mother    • Thyroid Mother    • Arthritis Mother    • Arthritis Father         cervical spine surgery   • Diabetes Father    • Hypertension Father    • Cancer Maternal Grandmother         Breast   • Other Sister         5 healthy   • Other Brother         6 healthy   • Cancer Sister 47        breast cancer   • Other Brother          GSW   • NEGATIVE FAMILY HX OF Daughter    • NEGATIVE FAMILY HX OF Son        REVIEW OF SYSTEMS:  CONSTITUTIONAL:  No Fevers/Chills  EYES:  No  visual changes  HEAD/EARS/NOSE/THROAT/MOUTH:  No Headache  RESPIRATORY:  No Cough/Sputum/Shortness of breath/Wheezing  CARDIOVASCULAR:  No Chest pain/Palpitations/LH  GASTROINTESTINAL:  No Abdominal pain/Nausea/Vomiting/Diarrhea/Melena/Bright red blood per rectum  GENITOURINARY:  No Dysuria/Frequency  INTEGUMENTARY:  No Rashes/Lumps  MUSCULOSKELETAL:  Positive for Chronic Joint pain  NEUROLOGICAL:  Positive for Numbness/Tingling/Pain  PSYCHIATRIC:  No Depression/Anxiety/Panic attacks    PHYSICAL EXAM:  Visit Vitals  /78 (BP Location: LUE - Left upper extremity, Patient Position: Sitting)   Pulse 87   Resp 18   Ht 5' 2\" (1.575 m)   LMP 07/17/2012   SpO2 98%   BMI 27.97 kg/m²       CONSTITUTIONAL:  No acute distress, Non-toxic appearing  EYES:  Pupils equal, round, reactive to light and accommodation, Extraocular movements intact, Conjunctivae are clear/pink- no signs of inflammation, Lids are normal, No scleral icterus  HEAD, EARS, NOSE, MOUTH, THROAT:  Normocephalic/Atraumatic, External exam of nose and ears is normal- No Lesions/Masses/Tenderness, External auditory canals normal bilaterally, Tympanic membranes are normal bilaterally with no erythema or bulging and no effusions or discharge- normal light reflex  NECK:  Supple, Normal range of motion, Overall normal appearance  RESPIRATORY:  Clear to auscultation bilaterally, Good air entry, Symmetrical expansion with respiration.  No Accessory muscle use/Retractions, No Wheezes/Rhonchi  CARDIOVASCULAR:  Regular rate and rhythm.  Normal S1/S2.  No S3/S4, No Murmurs/Rubs/Gallops.  No lower extremity edema or varicosities  GASTROINTESTINAL:  Scaphoid abdomen.  Soft, Non-tender, Non-distended, Bowel sounds present  MUSCULOSKELETAL: No Cyanosis/Clubbing/Edema, Capillary Refill<2 seconds- Triggering multiple fingers both hands  SKIN:  Warm, Dry, Inspection normal   PSYCHIATRIC:  The patient is well-nourished and well-groomed.  Alert & Oriented x 3.        DATA:  Labs:   Hemoglobin A1C (%)   Date Value   05/16/2022 5.8 (H)     Sodium (mmol/L)   Date Value   05/16/2022 139     Potassium (mmol/L)   Date Value   05/16/2022 4.0     Chloride (mmol/L)   Date Value   05/16/2022 105     Carbon Dioxide (mmol/L)   Date Value   05/16/2022 29     BUN (mg/dL)   Date Value   05/16/2022 23 (H)     Creatinine (mg/dL)   Date Value   09/10/2022 0.90     Glucose (mg/dL)   Date Value   05/16/2022 93     MICROALBUMIN, UA (TTL) (mg/dL)   Date Value   06/13/2019 3.18     Creatinine, Urine (mg/dL)   Date Value   05/16/2022 364.00     Microalbumin/ Creatinine Ratio (mg/g)   Date Value   05/16/2022 7.1     Cholesterol (mg/dL)   Date Value   05/16/2022 218 (H)     HDL (mg/dL)   Date Value   05/16/2022 76     LDL (mg/dL)   Date Value   05/16/2022 117     Triglycerides (mg/dL)   Date Value   05/16/2022 124       ASSESSMENT AND PLAN:    Benign essential HTN  (primary encounter diagnosis)  Comment: Stable. Patient advised to continue present medication management and/or any lifestyle/diet modifications previously discussed.     Vitamin D deficiency  Plan: Stable. Patient advised to continue present medication management and/or any lifestyle/diet modifications previously discussed.     Prediabetes  Plan: Poorly Controlled- As ordered, see Orders Placed and Medication Changes.     Neuropathic pain  Plan: Poorly Controlled- As ordered, see Orders Placed and Medication Changes.     Trigger finger, unspecified finger, unspecified laterality  Plan: Poorly Controlled- As ordered, see Orders Placed and Medication Changes.          Tamir Johnson MD    Yes

## 2023-01-03 NOTE — ED STATDOCS - NSCAREINITIATED _GEN_ER
Pt states she is having urgency and burning x7 days. Pt asking if something can be sent in, doesn't want to come in if she doesn't have to.  [facility] [employer] Rafa Brown(Attending)

## 2023-08-07 ENCOUNTER — EMERGENCY (EMERGENCY)
Facility: HOSPITAL | Age: 38
LOS: 1 days | Discharge: HOME | End: 2023-08-07
Attending: EMERGENCY MEDICINE
Payer: COMMERCIAL

## 2023-08-07 VITALS
WEIGHT: 169.98 LBS | RESPIRATION RATE: 18 BRPM | SYSTOLIC BLOOD PRESSURE: 139 MMHG | DIASTOLIC BLOOD PRESSURE: 87 MMHG | OXYGEN SATURATION: 99 % | HEART RATE: 81 BPM | HEIGHT: 62 IN | TEMPERATURE: 99 F

## 2023-08-07 VITALS
RESPIRATION RATE: 18 BRPM | TEMPERATURE: 98 F | SYSTOLIC BLOOD PRESSURE: 128 MMHG | DIASTOLIC BLOOD PRESSURE: 97 MMHG | OXYGEN SATURATION: 98 % | HEART RATE: 68 BPM

## 2023-08-07 LAB
ALBUMIN SERPL ELPH-MCNC: 4.2 G/DL — SIGNIFICANT CHANGE UP (ref 3.3–5.2)
ALP SERPL-CCNC: 72 U/L — SIGNIFICANT CHANGE UP (ref 40–120)
ALT FLD-CCNC: 40 U/L — SIGNIFICANT CHANGE UP
ANION GAP SERPL CALC-SCNC: 10 MMOL/L — SIGNIFICANT CHANGE UP (ref 5–17)
APPEARANCE UR: CLEAR — SIGNIFICANT CHANGE UP
AST SERPL-CCNC: 25 U/L — SIGNIFICANT CHANGE UP
BACTERIA # UR AUTO: NEGATIVE — SIGNIFICANT CHANGE UP
BASOPHILS # BLD AUTO: 0.04 K/UL — SIGNIFICANT CHANGE UP (ref 0–0.2)
BASOPHILS NFR BLD AUTO: 0.4 % — SIGNIFICANT CHANGE UP (ref 0–2)
BILIRUB SERPL-MCNC: 0.3 MG/DL — LOW (ref 0.4–2)
BILIRUB UR-MCNC: NEGATIVE — SIGNIFICANT CHANGE UP
BUN SERPL-MCNC: 12.2 MG/DL — SIGNIFICANT CHANGE UP (ref 8–20)
CALCIUM SERPL-MCNC: 8.8 MG/DL — SIGNIFICANT CHANGE UP (ref 8.4–10.5)
CHLORIDE SERPL-SCNC: 104 MMOL/L — SIGNIFICANT CHANGE UP (ref 96–108)
CO2 SERPL-SCNC: 25 MMOL/L — SIGNIFICANT CHANGE UP (ref 22–29)
COLOR SPEC: YELLOW — SIGNIFICANT CHANGE UP
CREAT SERPL-MCNC: 0.71 MG/DL — SIGNIFICANT CHANGE UP (ref 0.5–1.3)
DIFF PNL FLD: ABNORMAL
EGFR: 120 ML/MIN/1.73M2 — SIGNIFICANT CHANGE UP
EOSINOPHIL # BLD AUTO: 0.28 K/UL — SIGNIFICANT CHANGE UP (ref 0–0.5)
EOSINOPHIL NFR BLD AUTO: 2.8 % — SIGNIFICANT CHANGE UP (ref 0–6)
EPI CELLS # UR: SIGNIFICANT CHANGE UP
GLUCOSE SERPL-MCNC: 100 MG/DL — HIGH (ref 70–99)
GLUCOSE UR QL: NEGATIVE MG/DL — SIGNIFICANT CHANGE UP
HCT VFR BLD CALC: 43 % — SIGNIFICANT CHANGE UP (ref 39–50)
HGB BLD-MCNC: 14.5 G/DL — SIGNIFICANT CHANGE UP (ref 13–17)
IMM GRANULOCYTES NFR BLD AUTO: 0.4 % — SIGNIFICANT CHANGE UP (ref 0–0.9)
KETONES UR-MCNC: NEGATIVE — SIGNIFICANT CHANGE UP
LEUKOCYTE ESTERASE UR-ACNC: NEGATIVE — SIGNIFICANT CHANGE UP
LIDOCAIN IGE QN: 35 U/L — SIGNIFICANT CHANGE UP (ref 22–51)
LYMPHOCYTES # BLD AUTO: 2.59 K/UL — SIGNIFICANT CHANGE UP (ref 1–3.3)
LYMPHOCYTES # BLD AUTO: 25.6 % — SIGNIFICANT CHANGE UP (ref 13–44)
MCHC RBC-ENTMCNC: 31.3 PG — SIGNIFICANT CHANGE UP (ref 27–34)
MCHC RBC-ENTMCNC: 33.7 GM/DL — SIGNIFICANT CHANGE UP (ref 32–36)
MCV RBC AUTO: 92.9 FL — SIGNIFICANT CHANGE UP (ref 80–100)
MONOCYTES # BLD AUTO: 0.61 K/UL — SIGNIFICANT CHANGE UP (ref 0–0.9)
MONOCYTES NFR BLD AUTO: 6 % — SIGNIFICANT CHANGE UP (ref 2–14)
NEUTROPHILS # BLD AUTO: 6.56 K/UL — SIGNIFICANT CHANGE UP (ref 1.8–7.4)
NEUTROPHILS NFR BLD AUTO: 64.8 % — SIGNIFICANT CHANGE UP (ref 43–77)
NITRITE UR-MCNC: NEGATIVE — SIGNIFICANT CHANGE UP
PH UR: 7 — SIGNIFICANT CHANGE UP (ref 5–8)
PLATELET # BLD AUTO: 289 K/UL — SIGNIFICANT CHANGE UP (ref 150–400)
POTASSIUM SERPL-MCNC: 4.2 MMOL/L — SIGNIFICANT CHANGE UP (ref 3.5–5.3)
POTASSIUM SERPL-SCNC: 4.2 MMOL/L — SIGNIFICANT CHANGE UP (ref 3.5–5.3)
PROT SERPL-MCNC: 6.5 G/DL — LOW (ref 6.6–8.7)
PROT UR-MCNC: NEGATIVE MG/DL — SIGNIFICANT CHANGE UP
RBC # BLD: 4.63 M/UL — SIGNIFICANT CHANGE UP (ref 4.2–5.8)
RBC # FLD: 12.1 % — SIGNIFICANT CHANGE UP (ref 10.3–14.5)
RBC CASTS # UR COMP ASSIST: SIGNIFICANT CHANGE UP /HPF (ref 0–4)
SODIUM SERPL-SCNC: 139 MMOL/L — SIGNIFICANT CHANGE UP (ref 135–145)
SP GR SPEC: 1.01 — SIGNIFICANT CHANGE UP (ref 1.01–1.02)
UROBILINOGEN FLD QL: NEGATIVE MG/DL — SIGNIFICANT CHANGE UP
WBC # BLD: 10.12 K/UL — SIGNIFICANT CHANGE UP (ref 3.8–10.5)
WBC # FLD AUTO: 10.12 K/UL — SIGNIFICANT CHANGE UP (ref 3.8–10.5)
WBC UR QL: NEGATIVE /HPF — SIGNIFICANT CHANGE UP (ref 0–5)

## 2023-08-07 PROCEDURE — 87086 URINE CULTURE/COLONY COUNT: CPT

## 2023-08-07 PROCEDURE — 74176 CT ABD & PELVIS W/O CONTRAST: CPT | Mod: MA

## 2023-08-07 PROCEDURE — 96374 THER/PROPH/DIAG INJ IV PUSH: CPT

## 2023-08-07 PROCEDURE — 85025 COMPLETE CBC W/AUTO DIFF WBC: CPT

## 2023-08-07 PROCEDURE — 96372 THER/PROPH/DIAG INJ SC/IM: CPT | Mod: XU

## 2023-08-07 PROCEDURE — 87491 CHLMYD TRACH DNA AMP PROBE: CPT

## 2023-08-07 PROCEDURE — 36415 COLL VENOUS BLD VENIPUNCTURE: CPT

## 2023-08-07 PROCEDURE — 99284 EMERGENCY DEPT VISIT MOD MDM: CPT | Mod: 25

## 2023-08-07 PROCEDURE — 83690 ASSAY OF LIPASE: CPT

## 2023-08-07 PROCEDURE — 74176 CT ABD & PELVIS W/O CONTRAST: CPT | Mod: 26,MA

## 2023-08-07 PROCEDURE — 80053 COMPREHEN METABOLIC PANEL: CPT

## 2023-08-07 PROCEDURE — 87591 N.GONORRHOEAE DNA AMP PROB: CPT

## 2023-08-07 PROCEDURE — 99284 EMERGENCY DEPT VISIT MOD MDM: CPT

## 2023-08-07 PROCEDURE — 81001 URINALYSIS AUTO W/SCOPE: CPT

## 2023-08-07 PROCEDURE — 76870 US EXAM SCROTUM: CPT

## 2023-08-07 PROCEDURE — 76870 US EXAM SCROTUM: CPT | Mod: 26

## 2023-08-07 RX ORDER — KETOROLAC TROMETHAMINE 30 MG/ML
15 SYRINGE (ML) INJECTION ONCE
Refills: 0 | Status: DISCONTINUED | OUTPATIENT
Start: 2023-08-07 | End: 2023-08-07

## 2023-08-07 RX ORDER — CEFTRIAXONE 500 MG/1
500 INJECTION, POWDER, FOR SOLUTION INTRAMUSCULAR; INTRAVENOUS ONCE
Refills: 0 | Status: COMPLETED | OUTPATIENT
Start: 2023-08-07 | End: 2023-08-07

## 2023-08-07 RX ADMIN — CEFTRIAXONE 500 MILLIGRAM(S): 500 INJECTION, POWDER, FOR SOLUTION INTRAMUSCULAR; INTRAVENOUS at 21:19

## 2023-08-07 RX ADMIN — Medication 15 MILLIGRAM(S): at 13:52

## 2023-08-07 NOTE — ED PROVIDER NOTE - PHYSICAL EXAMINATION
General-alert and oriented to person place and time, nontoxic appearing, pleasant cooperative, NAD  HEENT-normocephalic, atraumatic, NT to palp, EOMI, PERRLA, no conjunctival injections, nares patent, pinna nt to palp, tympanic membrane intact bilaterally, nonbulging TM, no erythema noted, +light reflex, moist oral mucosa, tongue nonenlarged, uvula midline, tonsils nonenlarged, no exudates or erythema noted  Neck- supple, trach midline, No JVD, no LAD  Chest- Nt to palp, no reproducible pain  Cardio-s1,s2 present, regular rate and rhythm  Resp- talks in full sentences, symmetrical chest rise, CTA bilat, no evidence of wheezes, rhonchi noted  Abdomen- bowel sounds presnt in all 4 quadrants, soft, NT/ND, no guarding, no rebound tenderness  MSK- moves all extremities, able to ambulate without issues  Back- nt to palp of cervical, thoracic, lumbar spine, nt to palp of paraspinal m., No CVA tenderness  Neuro- no focal deficits, sensation intact General-alert and oriented to person place and time, nontoxic appearing, pleasant cooperative, NAD  HEENT-normocephalic, atraumatic, NT to palp, EOMI, PERRLA, no conjunctival injections, nares patent, pinna nt to palp, tympanic membrane intact bilaterally, nonbulging TM, no erythema noted, +light reflex, moist oral mucosa, tongue nonenlarged, uvula midline, tonsils nonenlarged, no exudates or erythema noted  Neck- supple, trach midline, No JVD, no LAD  Chest- Nt to palp, no reproducible pain  Cardio-s1,s2 present, regular rate and rhythm  Resp- talks in full sentences, symmetrical chest rise, CTA bilat, no evidence of wheezes, rhonchi noted  Abdomen- bowel sounds presnt in all 4 quadrants, soft, NT/ND, no guarding, no rebound tenderness  - nt to palp of testicles, normal cremaster reflex, no lesions, no discharge   MSK- moves all extremities, able to ambulate without issues  Back- nt to palp of cervical, thoracic, lumbar spine, nt to palp of paraspinal m., No CVA tenderness  Neuro- no focal deficits, sensation intact

## 2023-08-07 NOTE — ED PROVIDER NOTE - ATTENDING APP SHARED VISIT CONTRIBUTION OF CARE
I, Rosendo Ivy, have personally performed a face to face diagnostic evaluation on this patient. I have reviewed the DERIC note and agree with the history, exam and plan of care, except as noted.    39 yo M p/w right flank pain radiate to jose and testicle. He was treated for chlamydia. Lab values do not require emergent intervention. CT abdomen scan demonstrates no acute pathology. US testicle showed no acute pathology. Urinalysis demonstrates no acute pathology.  Urine culture pending. Outpatient follow up recommended.

## 2023-08-07 NOTE — ED ADULT NURSE NOTE - NSFALLUNIVINTERV_ED_ALL_ED
Bed/Stretcher in lowest position, wheels locked, appropriate side rails in place/Call bell, personal items and telephone in reach/Instruct patient to call for assistance before getting out of bed/chair/stretcher/Non-slip footwear applied when patient is off stretcher/[place] to call system/Physically safe environment - no spills, clutter or unnecessary equipment/Purposeful proactive rounding/Room/bathroom lighting operational, light cord in reach

## 2023-08-07 NOTE — ED PROVIDER NOTE - CLINICAL SUMMARY MEDICAL DECISION MAKING FREE TEXT BOX
38-year-old male with no significant medical history presents to the ED complaining of right flank pain down to the right groin for the past 2 weeks. 38-year-old male with no significant medical history presents to the ED complaining of right flank pain down to the right groin for the past 2 weeks. us of testicle normal, ct normal, hx of gc infection, pt notes he would like to get treated will give dose of ceftraixone and send doxy to [facility], f/u with pcp, Pt reassessed, pt feeling better at this time, vss, pt able to walk, talk and vocalized plan of action. Discussed in depth and explained to pt in depth the next steps that need to be taking including proper follow up with PCP or specialists. All incidental findings were discussed with pt as well. Pt verbalized their concerns and all questions were answered. Pt understands dispo and wants discharge. Given good instructions when to return to ED and importance of f/u.

## 2023-08-07 NOTE — ED ADULT NURSE NOTE - OBJECTIVE STATEMENT
Assumed care of patient in rw8. Pt a&ox4 rr even and unlabored with pmhx of kidney stone presents to ed with c/o of lower back pain radiating to the R side of groin. Pt denies urinary incontinence, blood in urine, chest pain, sob, fever, chills. pt educated on plan of care, pt able to successfully teach back plan of care to RN, RN will continue to reeducate pt during hospital stay.

## 2023-08-07 NOTE — ED PROVIDER NOTE - PATIENT PORTAL LINK FT
You can access the FollowMyHealth Patient Portal offered by [facility] by registering at the following website: [email or web address]. By joining [employer]’s FollowMyHealth portal, you will also be able to view your health information using other applications (apps) compatible with our system.

## 2023-08-07 NOTE — ED PROVIDER NOTE - OBJECTIVE STATEMENT
38-year-old male with no significant medical history presents to the ED complaining of right flank pain down to the right groin for the past 2 weeks.  Patient pain has been intermittent in nature. Notes he has a history of chlamydial infection he was treated within the past.  Notes did not take any medicine.  Denies pain with urination discharge fevers chills nausea vomiting.

## 2023-08-08 LAB
C TRACH RRNA SPEC QL NAA+PROBE: SIGNIFICANT CHANGE UP
N GONORRHOEA RRNA SPEC QL NAA+PROBE: SIGNIFICANT CHANGE UP

## 2023-08-09 LAB
CULTURE RESULTS: NO GROWTH — SIGNIFICANT CHANGE UP
SPECIMEN SOURCE: SIGNIFICANT CHANGE UP

## 2023-09-28 ENCOUNTER — APPOINTMENT (OUTPATIENT)
Dept: GASTROENTEROLOGY | Facility: CLINIC | Age: 38
End: 2023-09-28
Payer: MEDICAID

## 2023-09-28 VITALS
SYSTOLIC BLOOD PRESSURE: 120 MMHG | HEART RATE: 87 BPM | TEMPERATURE: 97.6 F | BODY MASS INDEX: 29.3 KG/M2 | RESPIRATION RATE: 16 BRPM | HEIGHT: 65.5 IN | OXYGEN SATURATION: 98 % | WEIGHT: 178 LBS | DIASTOLIC BLOOD PRESSURE: 80 MMHG

## 2023-09-28 DIAGNOSIS — F17.210 NICOTINE DEPENDENCE, CIGARETTES, UNCOMPLICATED: ICD-10-CM

## 2023-09-28 DIAGNOSIS — Z78.9 OTHER SPECIFIED HEALTH STATUS: ICD-10-CM

## 2023-09-28 PROBLEM — Z00.00 ENCOUNTER FOR PREVENTIVE HEALTH EXAMINATION: Status: ACTIVE | Noted: 2023-09-28

## 2023-09-28 PROCEDURE — 99204 OFFICE O/P NEW MOD 45 MIN: CPT

## 2023-09-29 ENCOUNTER — RESULT REVIEW (OUTPATIENT)
Age: 38
End: 2023-09-29

## 2023-10-02 LAB
ALBUMIN SERPL ELPH-MCNC: 4.7 G/DL
ALP BLD-CCNC: 84 U/L
ALT SERPL-CCNC: 103 U/L
AMYLASE/CREAT SERPL: 62 U/L
ANION GAP SERPL CALC-SCNC: 12 MMOL/L
AST SERPL-CCNC: 46 U/L
BACTERIA STL CULT: NORMAL
BILIRUB SERPL-MCNC: 0.3 MG/DL
BUN SERPL-MCNC: 9 MG/DL
CALCIUM SERPL-MCNC: 9.6 MG/DL
CHLORIDE SERPL-SCNC: 103 MMOL/L
CO2 SERPL-SCNC: 24 MMOL/L
CREAT SERPL-MCNC: 0.66 MG/DL
CRP SERPL-MCNC: <3 MG/L
DEPRECATED O AND P PREP STL: NORMAL
EGFR: 122 ML/MIN/1.73M2
ENDOMYSIUM IGA SER QL: NEGATIVE
ENDOMYSIUM IGA TITR SER: NORMAL
G LAMBLIA AG STL QL: NORMAL
GI PCR PANEL: NOT DETECTED
GLIADIN IGA SER QL: <5 UNITS
GLIADIN IGG SER QL: <5 UNITS
GLIADIN PEPTIDE IGA SER-ACNC: NEGATIVE
GLIADIN PEPTIDE IGG SER-ACNC: NEGATIVE
GLUCOSE SERPL-MCNC: 95 MG/DL
IGA SER QL IEP: 249 MG/DL
INR PPP: 0.91 RATIO
LPL SERPL-CCNC: 36 U/L
POTASSIUM SERPL-SCNC: 4.4 MMOL/L
PROT SERPL-MCNC: 7.1 G/DL
PT BLD: 10.4 SEC
SODIUM SERPL-SCNC: 138 MMOL/L
T3FREE SERPL-MCNC: 3.91 PG/ML
T4 FREE SERPL-MCNC: 1.2 NG/DL
TSH SERPL-ACNC: 2.09 UIU/ML
TTG IGA SER IA-ACNC: 1.2 U/ML
TTG IGA SER-ACNC: NEGATIVE
TTG IGG SER IA-ACNC: 3.3 U/ML
TTG IGG SER IA-ACNC: NEGATIVE

## 2023-10-03 ENCOUNTER — TRANSCRIPTION ENCOUNTER (OUTPATIENT)
Age: 38
End: 2023-10-03

## 2023-10-04 ENCOUNTER — OUTPATIENT (OUTPATIENT)
Dept: OUTPATIENT SERVICES | Facility: HOSPITAL | Age: 38
LOS: 1 days | End: 2023-10-04
Payer: COMMERCIAL

## 2023-10-04 ENCOUNTER — RESULT REVIEW (OUTPATIENT)
Age: 38
End: 2023-10-04

## 2023-10-04 ENCOUNTER — APPOINTMENT (OUTPATIENT)
Dept: GASTROENTEROLOGY | Facility: GI CENTER | Age: 38
End: 2023-10-04
Payer: MEDICAID

## 2023-10-04 DIAGNOSIS — R10.13 EPIGASTRIC PAIN: ICD-10-CM

## 2023-10-04 DIAGNOSIS — R19.7 DIARRHEA, UNSPECIFIED: ICD-10-CM

## 2023-10-04 PROCEDURE — 88342 IMHCHEM/IMCYTCHM 1ST ANTB: CPT

## 2023-10-04 PROCEDURE — 88305 TISSUE EXAM BY PATHOLOGIST: CPT | Mod: 26

## 2023-10-04 PROCEDURE — 88342 IMHCHEM/IMCYTCHM 1ST ANTB: CPT | Mod: 26

## 2023-10-04 PROCEDURE — 43239 EGD BIOPSY SINGLE/MULTIPLE: CPT

## 2023-10-04 PROCEDURE — 88305 TISSUE EXAM BY PATHOLOGIST: CPT

## 2023-10-05 ENCOUNTER — APPOINTMENT (OUTPATIENT)
Dept: CT IMAGING | Facility: CLINIC | Age: 38
End: 2023-10-05
Payer: MEDICAID

## 2023-10-05 ENCOUNTER — OUTPATIENT (OUTPATIENT)
Dept: OUTPATIENT SERVICES | Facility: HOSPITAL | Age: 38
LOS: 1 days | End: 2023-10-05
Payer: MEDICAID

## 2023-10-05 DIAGNOSIS — R10.13 EPIGASTRIC PAIN: ICD-10-CM

## 2023-10-05 LAB
C DIFF TOX B STL QL CT TISS CULT: NORMAL
Lab: NORMAL

## 2023-10-05 PROCEDURE — 74177 CT ABD & PELVIS W/CONTRAST: CPT

## 2023-10-05 PROCEDURE — 74177 CT ABD & PELVIS W/CONTRAST: CPT | Mod: 26

## 2023-10-06 LAB
A1AT SERPL-MCNC: 132 MG/DL
AFP-TM SERPL-MCNC: 3.5 NG/ML
ALBUMIN SERPL ELPH-MCNC: 4.6 G/DL
ALP BLD-CCNC: 86 U/L
ALT SERPL-CCNC: 71 U/L
AST SERPL-CCNC: 32 U/L
BILIRUB SERPL-MCNC: 0.3 MG/DL
CERULOPLASMIN SERPL-MCNC: 21 MG/DL
FERRITIN SERPL-MCNC: 150 NG/ML
INR PPP: 0.94 RATIO
IRON SATN MFR SERPL: 22 %
IRON SERPL-MCNC: 75 UG/DL
PT BLD: 10.6 SEC
TIBC SERPL-MCNC: 346 UG/DL
UIBC SERPL-MCNC: 271 UG/DL

## 2023-10-09 LAB
HBV CORE IGG+IGM SER QL: NONREACTIVE
HBV SURFACE AB SER QL: NONREACTIVE
HBV SURFACE AG SER QL: NONREACTIVE
HCV AB SER QL: NONREACTIVE
HCV S/CO RATIO: 0.25 S/CO
MITOCHONDRIA AB SER IF-ACNC: NORMAL
SMOOTH MUSCLE AB SER QL IF: NORMAL

## 2023-10-10 LAB
ANA SER IF-ACNC: NEGATIVE
LKM AB SER QL IF: <20.1 UNITS
SURGICAL PATHOLOGY STUDY: SIGNIFICANT CHANGE UP

## 2023-10-23 ENCOUNTER — APPOINTMENT (OUTPATIENT)
Dept: GASTROENTEROLOGY | Facility: CLINIC | Age: 38
End: 2023-10-23
Payer: MEDICAID

## 2023-10-23 VITALS
DIASTOLIC BLOOD PRESSURE: 84 MMHG | OXYGEN SATURATION: 97 % | BODY MASS INDEX: 29.3 KG/M2 | WEIGHT: 178 LBS | HEIGHT: 65.5 IN | HEART RATE: 79 BPM | RESPIRATION RATE: 16 BRPM | SYSTOLIC BLOOD PRESSURE: 130 MMHG

## 2023-10-23 DIAGNOSIS — K21.9 GASTRO-ESOPHAGEAL REFLUX DISEASE W/OUT ESOPHAGITIS: ICD-10-CM

## 2023-10-23 DIAGNOSIS — R79.89 OTHER SPECIFIED ABNORMAL FINDINGS OF BLOOD CHEMISTRY: ICD-10-CM

## 2023-10-23 PROCEDURE — 99214 OFFICE O/P EST MOD 30 MIN: CPT

## 2023-10-23 PROCEDURE — T1013A: CUSTOM

## 2023-10-23 RX ORDER — PANTOPRAZOLE 40 MG/1
40 TABLET, DELAYED RELEASE ORAL
Qty: 1 | Refills: 1 | Status: ACTIVE | COMMUNITY
Start: 2023-10-23 | End: 1900-01-01

## 2023-10-23 RX ORDER — PNV NO.95/FERROUS FUM/FOLIC AC 28MG-0.8MG
400 TABLET ORAL DAILY
Qty: 30 | Refills: 5 | Status: ACTIVE | COMMUNITY
Start: 2023-10-23 | End: 1900-01-01

## 2023-11-01 ENCOUNTER — APPOINTMENT (OUTPATIENT)
Dept: GASTROENTEROLOGY | Facility: CLINIC | Age: 38
End: 2023-11-01

## 2023-11-29 ENCOUNTER — APPOINTMENT (OUTPATIENT)
Dept: GASTROENTEROLOGY | Facility: CLINIC | Age: 38
End: 2023-11-29

## 2024-02-29 ENCOUNTER — EMERGENCY (EMERGENCY)
Facility: HOSPITAL | Age: 39
LOS: 1 days | Discharge: HOME | End: 2024-02-29
Attending: STUDENT IN AN ORGANIZED HEALTH CARE EDUCATION/TRAINING PROGRAM
Payer: MEDICAID

## 2024-02-29 VITALS
RESPIRATION RATE: 20 BRPM | HEART RATE: 78 BPM | DIASTOLIC BLOOD PRESSURE: 78 MMHG | WEIGHT: 179.24 LBS | OXYGEN SATURATION: 99 % | TEMPERATURE: 98 F | SYSTOLIC BLOOD PRESSURE: 135 MMHG

## 2024-02-29 VITALS
HEART RATE: 60 BPM | RESPIRATION RATE: 18 BRPM | SYSTOLIC BLOOD PRESSURE: 111 MMHG | DIASTOLIC BLOOD PRESSURE: 72 MMHG | OXYGEN SATURATION: 98 % | TEMPERATURE: 98 F

## 2024-02-29 LAB
ALBUMIN SERPL ELPH-MCNC: 4.3 G/DL — SIGNIFICANT CHANGE UP (ref 3.3–5.2)
ALP SERPL-CCNC: 89 U/L — SIGNIFICANT CHANGE UP (ref 40–120)
ALT FLD-CCNC: 44 U/L — HIGH
ANION GAP SERPL CALC-SCNC: 12 MMOL/L — SIGNIFICANT CHANGE UP (ref 5–17)
AST SERPL-CCNC: 28 U/L — SIGNIFICANT CHANGE UP
BASOPHILS # BLD AUTO: 0.04 K/UL — SIGNIFICANT CHANGE UP (ref 0–0.2)
BASOPHILS NFR BLD AUTO: 0.5 % — SIGNIFICANT CHANGE UP (ref 0–2)
BILIRUB SERPL-MCNC: 0.4 MG/DL — SIGNIFICANT CHANGE UP (ref 0.4–2)
BUN SERPL-MCNC: 14.6 MG/DL — SIGNIFICANT CHANGE UP (ref 8–20)
CALCIUM SERPL-MCNC: 9 MG/DL — SIGNIFICANT CHANGE UP (ref 8.4–10.5)
CHLORIDE SERPL-SCNC: 102 MMOL/L — SIGNIFICANT CHANGE UP (ref 96–108)
CO2 SERPL-SCNC: 23 MMOL/L — SIGNIFICANT CHANGE UP (ref 22–29)
CREAT SERPL-MCNC: 0.62 MG/DL — SIGNIFICANT CHANGE UP (ref 0.5–1.3)
EGFR: 125 ML/MIN/1.73M2 — SIGNIFICANT CHANGE UP
EOSINOPHIL # BLD AUTO: 0.33 K/UL — SIGNIFICANT CHANGE UP (ref 0–0.5)
EOSINOPHIL NFR BLD AUTO: 3.9 % — SIGNIFICANT CHANGE UP (ref 0–6)
GLUCOSE SERPL-MCNC: 109 MG/DL — HIGH (ref 70–99)
HCT VFR BLD CALC: 46.3 % — SIGNIFICANT CHANGE UP (ref 39–50)
HGB BLD-MCNC: 15.6 G/DL — SIGNIFICANT CHANGE UP (ref 13–17)
IMM GRANULOCYTES NFR BLD AUTO: 1.6 % — HIGH (ref 0–0.9)
LIDOCAIN IGE QN: 27 U/L — SIGNIFICANT CHANGE UP (ref 22–51)
LYMPHOCYTES # BLD AUTO: 1.27 K/UL — SIGNIFICANT CHANGE UP (ref 1–3.3)
LYMPHOCYTES # BLD AUTO: 15.2 % — SIGNIFICANT CHANGE UP (ref 13–44)
MCHC RBC-ENTMCNC: 31.2 PG — SIGNIFICANT CHANGE UP (ref 27–34)
MCHC RBC-ENTMCNC: 33.7 GM/DL — SIGNIFICANT CHANGE UP (ref 32–36)
MCV RBC AUTO: 92.6 FL — SIGNIFICANT CHANGE UP (ref 80–100)
MONOCYTES # BLD AUTO: 0.57 K/UL — SIGNIFICANT CHANGE UP (ref 0–0.9)
MONOCYTES NFR BLD AUTO: 6.8 % — SIGNIFICANT CHANGE UP (ref 2–14)
NEUTROPHILS # BLD AUTO: 6.04 K/UL — SIGNIFICANT CHANGE UP (ref 1.8–7.4)
NEUTROPHILS NFR BLD AUTO: 72 % — SIGNIFICANT CHANGE UP (ref 43–77)
PLATELET # BLD AUTO: 273 K/UL — SIGNIFICANT CHANGE UP (ref 150–400)
POTASSIUM SERPL-MCNC: 3.8 MMOL/L — SIGNIFICANT CHANGE UP (ref 3.5–5.3)
POTASSIUM SERPL-SCNC: 3.8 MMOL/L — SIGNIFICANT CHANGE UP (ref 3.5–5.3)
PROT SERPL-MCNC: 7 G/DL — SIGNIFICANT CHANGE UP (ref 6.6–8.7)
RBC # BLD: 5 M/UL — SIGNIFICANT CHANGE UP (ref 4.2–5.8)
RBC # FLD: 12.5 % — SIGNIFICANT CHANGE UP (ref 10.3–14.5)
SODIUM SERPL-SCNC: 137 MMOL/L — SIGNIFICANT CHANGE UP (ref 135–145)
WBC # BLD: 8.38 K/UL — SIGNIFICANT CHANGE UP (ref 3.8–10.5)
WBC # FLD AUTO: 8.38 K/UL — SIGNIFICANT CHANGE UP (ref 3.8–10.5)

## 2024-02-29 PROCEDURE — 36415 COLL VENOUS BLD VENIPUNCTURE: CPT

## 2024-02-29 PROCEDURE — 96374 THER/PROPH/DIAG INJ IV PUSH: CPT

## 2024-02-29 PROCEDURE — 96375 TX/PRO/DX INJ NEW DRUG ADDON: CPT

## 2024-02-29 PROCEDURE — 83690 ASSAY OF LIPASE: CPT

## 2024-02-29 PROCEDURE — 96361 HYDRATE IV INFUSION ADD-ON: CPT

## 2024-02-29 PROCEDURE — 99284 EMERGENCY DEPT VISIT MOD MDM: CPT | Mod: 25

## 2024-02-29 PROCEDURE — 80053 COMPREHEN METABOLIC PANEL: CPT

## 2024-02-29 PROCEDURE — 99284 EMERGENCY DEPT VISIT MOD MDM: CPT

## 2024-02-29 PROCEDURE — 85025 COMPLETE CBC W/AUTO DIFF WBC: CPT

## 2024-02-29 RX ORDER — ONDANSETRON 8 MG/1
1 TABLET, FILM COATED ORAL
Qty: 4 | Refills: 0
Start: 2024-02-29

## 2024-02-29 RX ORDER — SODIUM CHLORIDE 9 MG/ML
1000 INJECTION INTRAMUSCULAR; INTRAVENOUS; SUBCUTANEOUS ONCE
Refills: 0 | Status: COMPLETED | OUTPATIENT
Start: 2024-02-29 | End: 2024-02-29

## 2024-02-29 RX ORDER — FAMOTIDINE 10 MG/ML
1 INJECTION INTRAVENOUS
Qty: 10 | Refills: 0
Start: 2024-02-29

## 2024-02-29 RX ORDER — ONDANSETRON 8 MG/1
4 TABLET, FILM COATED ORAL ONCE
Refills: 0 | Status: COMPLETED | OUTPATIENT
Start: 2024-02-29 | End: 2024-02-29

## 2024-02-29 RX ORDER — KETOROLAC TROMETHAMINE 30 MG/ML
15 SYRINGE (ML) INJECTION ONCE
Refills: 0 | Status: DISCONTINUED | OUTPATIENT
Start: 2024-02-29 | End: 2024-02-29

## 2024-02-29 RX ORDER — FAMOTIDINE 10 MG/ML
20 INJECTION INTRAVENOUS ONCE
Refills: 0 | Status: COMPLETED | OUTPATIENT
Start: 2024-02-29 | End: 2024-02-29

## 2024-02-29 RX ADMIN — Medication 15 MILLIGRAM(S): at 06:32

## 2024-02-29 RX ADMIN — SODIUM CHLORIDE 1000 MILLILITER(S): 9 INJECTION INTRAMUSCULAR; INTRAVENOUS; SUBCUTANEOUS at 06:32

## 2024-02-29 RX ADMIN — SODIUM CHLORIDE 1000 MILLILITER(S): 9 INJECTION INTRAMUSCULAR; INTRAVENOUS; SUBCUTANEOUS at 07:45

## 2024-02-29 RX ADMIN — ONDANSETRON 4 MILLIGRAM(S): 8 TABLET, FILM COATED ORAL at 06:32

## 2024-02-29 RX ADMIN — Medication 15 MILLIGRAM(S): at 07:25

## 2024-02-29 RX ADMIN — FAMOTIDINE 20 MILLIGRAM(S): 10 INJECTION INTRAVENOUS at 06:32

## 2024-02-29 NOTE — ED PROVIDER NOTE - OBJECTIVE STATEMENT
37 yo M with no PMHx presents c/o generalized abd pain, N/V/D x 3 hours. denies fever, denies blood in stool or emesis. no past abd surgeries. denies sick contacts.

## 2024-02-29 NOTE — ED ADULT TRIAGE NOTE - CHIEF COMPLAINT QUOTE
Ambulatory to ED with c/o abdominal pain since 0300. 5 episodes of nonbloody emesis and diarrhea. denies meds pta.

## 2024-02-29 NOTE — ED ADULT NURSE NOTE - NSFALLUNIVINTERV_ED_ALL_ED
Bed/Stretcher in lowest position, wheels locked, appropriate side rails in place/Call bell, personal items and telephone in reach/Instruct patient to call for assistance before getting out of bed/chair/stretcher/Non-slip footwear applied when patient is off stretcher/[place] to call system/Physically safe environment - no spills, clutter or unnecessary equipment/Purposeful proactive rounding/Room/bathroom lighting operational, light cord in reach

## 2024-02-29 NOTE — ED ADULT NURSE REASSESSMENT NOTE - NS ED NURSE REASSESS COMMENT FT1
Assumed care of pt from SURAJ Arndt.    Pt sitting up in recliner, A&Ox4 in NAD @ this time. RR even & unlabored. Pt denies any pain, complaints, or needs @ this time. Pt awaiting results. Safety maintained.

## 2024-02-29 NOTE — ED PROVIDER NOTE - ATTENDING APP SHARED VISIT CONTRIBUTION OF CARE
I, Riley Mckeon, personally saw the patient with the PA, and completed the key components of the history and physical exam. I then discussed the management plan with the PA.

## 2024-02-29 NOTE — ED PROVIDER NOTE - NSFOLLOWUPINSTRUCTIONS_ED_ALL_ED_FT
Goal Outcome Evaluation:           Progress: improving  Outcome Evaluation: Room air. Oral suction at bedside for patient to use as needed. Antibiotics given per mar. Tube feeds continue at goal rate. Frequent repositioning and incontinence care provided. extremely soft spoken but alert and oriented x4.          Please follow up with primary care doctor in 2-3 days  Return to ER for any new or worsening symptoms    Nausea / Vomiting    Nausea is the feeling that you have to vomit. As nausea gets worse, it can lead to vomiting. Vomiting puts you at an increased risk for dehydration. Older adults and people with other diseases or a weak immune system are at higher risk for dehydration. Drink clear fluids in small but frequent amounts as tolerated. Eat bland, easy-to-digest foods in small amounts as tolerated.    SEEK IMMEDIATE MEDICAL CARE IF YOU HAVE ANY OF THE FOLLOWING SYMPTOMS: fever, inability to keep sufficient fluids down, black or bloody vomitus, black or bloody stools, lightheadedness/dizziness, chest pain, severe headache, rash, shortness of breath, cold or clammy skin, confusion, pain with urination, or any signs of dehydration.

## 2024-02-29 NOTE — ED PROVIDER NOTE - PRO INTERPRETER NEED 2
GASTROINTESTINAL - ADULT    • Minimal or absence of nausea and vomiting Progressing    • Maintains or returns to baseline bowel function Progressing        PAIN - ADULT    • Verbalizes/displays adequate comfort level or patient's stated pain goal Progressi English

## 2024-02-29 NOTE — ED PROVIDER NOTE - PHYSICAL EXAMINATION
Gen: No acute distress, non toxic  HEENT: Mucous membranes moist, pink conjunctivae, EOMI  CV: RRR, nl s1/s2.  Resp: CTAB, normal rate and effort  GI: Abdomen soft, mild b/l lower abd ttp. No rebound, no guarding  : No CVAT  Neuro: A&O x 3, moving all 4 extremities  MSK: No spine or joint tenderness to palpation  Skin: No rashes. intact and perfused.

## 2024-02-29 NOTE — ED PROVIDER NOTE - PATIENT PORTAL LINK FT
You can access the FollowMyHealth Patient Portal offered by [facility] by registering at the following website: [email or web address]. By joining [employer]’s FollowMyHealth portal, you will also be able to view your health information using other applications (apps) compatible with our system.

## 2024-02-29 NOTE — ED PROVIDER NOTE - CLINICAL SUMMARY MEDICAL DECISION MAKING FREE TEXT BOX
37 yo M with no PMHx presents c/o generalized abd pain, N/V/D x 3 hours. Abdomen soft minimal b/l lower abd ttp no rebound/guarding. Likely gastroenteritis. Will obtain labs give IVF/meds and reassess 39 yo M with no PMHx presents c/o generalized abd pain, N/V/D x 3 hours. Abdomen soft minimal b/l lower abd ttp no rebound/guarding. Likely gastroenteritis. Will obtain labs give IVF/meds and reassess    labs unremarkable abd soft nontender pt feels better and toleraitng Po will dc f/u pcp, return precautions

## 2024-10-22 ENCOUNTER — APPOINTMENT (OUTPATIENT)
Dept: GASTROENTEROLOGY | Facility: CLINIC | Age: 39
End: 2024-10-22

## (undated) DEVICE — FORCEP ENDOJAW AGTR LC W NDL 2.8MM 230CM DISP

## (undated) DEVICE — VALVE ENDO SURESEAL II 0-5FR